# Patient Record
Sex: MALE | Race: WHITE
[De-identification: names, ages, dates, MRNs, and addresses within clinical notes are randomized per-mention and may not be internally consistent; named-entity substitution may affect disease eponyms.]

---

## 2018-12-14 ENCOUNTER — HOSPITAL ENCOUNTER (OUTPATIENT)
Dept: HOSPITAL 61 - PCVCCLINIC | Age: 68
Discharge: HOME | End: 2018-12-14
Attending: INTERNAL MEDICINE
Payer: MEDICARE

## 2018-12-14 DIAGNOSIS — Z91.013: ICD-10-CM

## 2018-12-14 DIAGNOSIS — E11.9: Primary | ICD-10-CM

## 2018-12-14 DIAGNOSIS — R00.2: ICD-10-CM

## 2018-12-14 DIAGNOSIS — G47.33: ICD-10-CM

## 2018-12-14 DIAGNOSIS — R94.31: ICD-10-CM

## 2018-12-14 DIAGNOSIS — E78.5: ICD-10-CM

## 2018-12-14 DIAGNOSIS — I48.1: ICD-10-CM

## 2018-12-14 DIAGNOSIS — E66.9: ICD-10-CM

## 2018-12-14 DIAGNOSIS — Z79.84: ICD-10-CM

## 2018-12-14 DIAGNOSIS — Z91.048: ICD-10-CM

## 2018-12-14 DIAGNOSIS — Z79.899: ICD-10-CM

## 2018-12-14 PROCEDURE — 93005 ELECTROCARDIOGRAM TRACING: CPT

## 2018-12-14 PROCEDURE — G0463 HOSPITAL OUTPT CLINIC VISIT: HCPCS

## 2018-12-14 PROCEDURE — 36415 COLL VENOUS BLD VENIPUNCTURE: CPT

## 2018-12-14 PROCEDURE — 80061 LIPID PANEL: CPT

## 2018-12-21 ENCOUNTER — HOSPITAL ENCOUNTER (OUTPATIENT)
Dept: HOSPITAL 35 - CATH | Age: 68
Discharge: HOME | End: 2018-12-21
Attending: INTERNAL MEDICINE
Payer: COMMERCIAL

## 2018-12-21 VITALS — HEIGHT: 70 IN | BODY MASS INDEX: 42.95 KG/M2 | WEIGHT: 300 LBS

## 2018-12-21 VITALS — SYSTOLIC BLOOD PRESSURE: 107 MMHG | DIASTOLIC BLOOD PRESSURE: 74 MMHG

## 2018-12-21 DIAGNOSIS — Z79.01: ICD-10-CM

## 2018-12-21 DIAGNOSIS — Z91.013: ICD-10-CM

## 2018-12-21 DIAGNOSIS — Z79.899: ICD-10-CM

## 2018-12-21 DIAGNOSIS — I48.91: Primary | ICD-10-CM

## 2018-12-21 LAB
ALBUMIN SERPL-MCNC: 3.8 G/DL (ref 3.4–5)
ALT SERPL-CCNC: 32 U/L (ref 30–65)
ANION GAP SERPL CALC-SCNC: 8 MMOL/L (ref 7–16)
APTT BLD: 39 SECONDS (ref 24.5–32.8)
AST SERPL-CCNC: 25 U/L (ref 15–37)
BILIRUB SERPL-MCNC: 0.7 MG/DL
BUN SERPL-MCNC: 20 MG/DL (ref 7–18)
CALCIUM SERPL-MCNC: 9.4 MG/DL (ref 8.5–10.1)
CHLORIDE SERPL-SCNC: 103 MMOL/L (ref 98–107)
CO2 SERPL-SCNC: 30 MMOL/L (ref 21–32)
CREAT SERPL-MCNC: 1.1 MG/DL (ref 0.7–1.3)
ERYTHROCYTE [DISTWIDTH] IN BLOOD BY AUTOMATED COUNT: 13.6 % (ref 10.5–14.5)
GLUCOSE SERPL-MCNC: 118 MG/DL (ref 74–106)
HCT VFR BLD CALC: 43.1 % (ref 42–52)
HGB BLD-MCNC: 14.6 GM/DL (ref 14–18)
INR PPP: 1.2
MCH RBC QN AUTO: 30.2 PG (ref 26–34)
MCHC RBC AUTO-ENTMCNC: 33.8 G/DL (ref 28–37)
MCV RBC: 89.3 FL (ref 80–100)
PLATELET # BLD: 245 THOU/UL (ref 150–400)
POTASSIUM SERPL-SCNC: 5 MMOL/L (ref 3.5–5.1)
PROT SERPL-MCNC: 8 G/DL (ref 6.4–8.2)
PROTHROMBIN TIME: 12.4 SECONDS (ref 9.3–11.4)
RBC # BLD AUTO: 4.83 MIL/UL (ref 4.5–6)
SODIUM SERPL-SCNC: 141 MMOL/L (ref 136–145)
WBC # BLD AUTO: 6.2 THOU/UL (ref 4–11)

## 2018-12-21 NOTE — EKG
[place] [address] [facility]
[place]  [address]
Phone:  [phone]                    ELECTROCARDIOGRAM REPORT      
_______________________________________________________________________________
 
Name:       ZAYNAB HANEY               Room #:                     REG CL 
SAULO#:      [id number]     Account #:      [id number]  
Admission:  18    Attend Phys:    Pillo Licea MD
Discharge:              Date of Birth:  [birth year]  
                                                          Report #: [id number]
   [id number]
_______________________________________________________________________________
THIS REPORT FOR:   //name//                          
 
                          [facility]
                                       
Test Date:    2018               Test Time:    13:58:43
Pat Name:     ZAYNAB HANEY            Department:   
Patient ID:   [id number]            Room:          
Gender:                               Technician:   LAMINE STEPHENSON
:          1950               Requested By: Pillo Licea
Order Number: [id number] MD:   Pillo Licea
                                 Measurements
Intervals                              Axis          
Rate:         56                       P:            5
AK:           265                      QRS:          -73
QRSD:         125                      T:            -13
QT:           455                                    
QTc:          440                                    
                           Interpretive Statements
Sinus rhythm
Prolonged AK interval
Nonspecific IVCD with LAD
Inferior infarct, age indeterminate
Consider anterior infarct
Lateral leads are also involved
Baseline wander in lead(s) V1
Compared to ECG 2016 10:13:06
First degree AV block now present
Intraventricular conduction delay now present
Atrial premature complex(es) no longer present
Myocardial infarct finding still present
 
Electronically Signed On 2018 20:16:46 CST by Pillo Licea
[email or web address]
 
 
 
 
 
 
 
 
 
 
  <ELECTRONICALLY SIGNED>
   By: Pillo Licea MD        
  18
D: 18 1358                           _____________________________________
T: 18 1358                           Pillo Licea MD          /EPI

## 2020-04-22 ENCOUNTER — HOSPITAL ENCOUNTER (OUTPATIENT)
Dept: HOSPITAL 35 - SJCVCIMAG | Age: 70
End: 2020-04-22
Attending: INTERNAL MEDICINE
Payer: COMMERCIAL

## 2020-04-22 DIAGNOSIS — Z79.899: ICD-10-CM

## 2020-04-22 DIAGNOSIS — E66.09: ICD-10-CM

## 2020-04-22 DIAGNOSIS — Z82.49: ICD-10-CM

## 2020-04-22 DIAGNOSIS — E11.9: ICD-10-CM

## 2020-04-22 DIAGNOSIS — I11.9: Primary | ICD-10-CM

## 2020-04-22 DIAGNOSIS — I48.91: ICD-10-CM

## 2020-04-22 DIAGNOSIS — Z79.84: ICD-10-CM

## 2020-04-22 DIAGNOSIS — E78.2: ICD-10-CM

## 2020-04-22 DIAGNOSIS — E78.5: ICD-10-CM

## 2020-10-27 ENCOUNTER — HOSPITAL ENCOUNTER (OUTPATIENT)
Dept: HOSPITAL 35 - SJCVC | Age: 70
End: 2020-10-27
Attending: INTERNAL MEDICINE
Payer: COMMERCIAL

## 2020-10-27 DIAGNOSIS — R94.31: ICD-10-CM

## 2020-10-27 DIAGNOSIS — E11.9: ICD-10-CM

## 2020-10-27 DIAGNOSIS — I44.0: ICD-10-CM

## 2020-10-27 DIAGNOSIS — E78.5: ICD-10-CM

## 2020-10-27 DIAGNOSIS — I48.91: ICD-10-CM

## 2020-10-27 DIAGNOSIS — Z79.899: ICD-10-CM

## 2020-10-27 DIAGNOSIS — E66.01: ICD-10-CM

## 2020-10-27 DIAGNOSIS — I10: Primary | ICD-10-CM

## 2021-03-30 ENCOUNTER — HOSPITAL ENCOUNTER (OUTPATIENT)
Dept: HOSPITAL 35 - SJCVC | Age: 71
End: 2021-03-30
Attending: INTERNAL MEDICINE
Payer: COMMERCIAL

## 2021-03-30 DIAGNOSIS — I44.0: ICD-10-CM

## 2021-03-30 DIAGNOSIS — E78.2: ICD-10-CM

## 2021-03-30 DIAGNOSIS — R94.31: Primary | ICD-10-CM

## 2021-03-30 DIAGNOSIS — G47.33: ICD-10-CM

## 2021-03-30 DIAGNOSIS — Z79.899: ICD-10-CM

## 2021-03-30 DIAGNOSIS — I10: ICD-10-CM

## 2021-03-30 DIAGNOSIS — Z72.89: ICD-10-CM

## 2021-03-30 DIAGNOSIS — E11.9: ICD-10-CM

## 2021-03-30 DIAGNOSIS — Z79.84: ICD-10-CM

## 2021-03-30 DIAGNOSIS — I48.91: ICD-10-CM

## 2021-03-30 DIAGNOSIS — Z88.8: ICD-10-CM

## 2021-03-30 DIAGNOSIS — E66.01: ICD-10-CM

## 2021-10-13 ENCOUNTER — HOSPITAL ENCOUNTER (OUTPATIENT)
Dept: HOSPITAL 35 - SJCVCIMAG | Age: 71
End: 2021-10-13
Attending: INTERNAL MEDICINE
Payer: COMMERCIAL

## 2021-10-13 DIAGNOSIS — R94.31: Primary | ICD-10-CM

## 2021-10-13 DIAGNOSIS — I10: ICD-10-CM

## 2021-10-13 DIAGNOSIS — Z72.89: ICD-10-CM

## 2021-10-13 DIAGNOSIS — Z79.84: ICD-10-CM

## 2021-10-13 DIAGNOSIS — E78.2: ICD-10-CM

## 2021-10-13 DIAGNOSIS — E78.5: ICD-10-CM

## 2021-10-13 DIAGNOSIS — I44.7: ICD-10-CM

## 2021-10-13 DIAGNOSIS — E11.9: ICD-10-CM

## 2021-10-13 DIAGNOSIS — I35.8: ICD-10-CM

## 2021-10-13 DIAGNOSIS — I44.4: ICD-10-CM

## 2021-10-13 DIAGNOSIS — Z01.810: ICD-10-CM

## 2021-10-13 DIAGNOSIS — Z79.899: ICD-10-CM

## 2021-10-13 DIAGNOSIS — E66.9: ICD-10-CM

## 2021-10-13 DIAGNOSIS — I48.91: ICD-10-CM

## 2021-10-13 DIAGNOSIS — I44.0: ICD-10-CM

## 2021-10-13 DIAGNOSIS — G47.33: ICD-10-CM

## 2021-10-13 DIAGNOSIS — E66.09: ICD-10-CM
